# Patient Record
Sex: MALE | Race: ASIAN | Employment: FULL TIME | URBAN - METROPOLITAN AREA
[De-identification: names, ages, dates, MRNs, and addresses within clinical notes are randomized per-mention and may not be internally consistent; named-entity substitution may affect disease eponyms.]

---

## 2017-03-29 ENCOUNTER — OFFICE VISIT (OUTPATIENT)
Dept: INTERNAL MEDICINE CLINIC | Facility: CLINIC | Age: 32
End: 2017-03-29

## 2017-03-29 VITALS
TEMPERATURE: 98 F | WEIGHT: 140 LBS | HEART RATE: 72 BPM | RESPIRATION RATE: 10 BRPM | OXYGEN SATURATION: 99 % | BODY MASS INDEX: 23.32 KG/M2 | DIASTOLIC BLOOD PRESSURE: 82 MMHG | HEIGHT: 65 IN | SYSTOLIC BLOOD PRESSURE: 128 MMHG

## 2017-03-29 DIAGNOSIS — Z86.79 HISTORY OF HYPERTENSION: ICD-10-CM

## 2017-03-29 DIAGNOSIS — Z00.00 HEALTHY ADULT ON ROUTINE PHYSICAL EXAMINATION: Primary | ICD-10-CM

## 2017-03-29 PROCEDURE — 99214 OFFICE O/P EST MOD 30 MIN: CPT | Performed by: INTERNAL MEDICINE

## 2017-03-29 PROCEDURE — 93000 ELECTROCARDIOGRAM COMPLETE: CPT | Performed by: INTERNAL MEDICINE

## 2017-03-29 RX ORDER — MULTIVITAMIN
1 TABLET ORAL DAILY
COMMUNITY

## 2017-03-29 NOTE — PROGRESS NOTES
Astrid Klye is a 33year old male. Patient presents with:  Physical: pt presents to clinic for cpx       HPI:                Had HTN in was past, about [id number]; had medicine [id number]; then off meds. Leifestyle changes with low salt, exercise.     Sc TempSrc: Oral   Resp: 10   Height: 65\"   Weight: 140 lb   SpO2: 99%     Body mass index is 23.3 kg/(m^2). Patient is alert, orientated, in no acute distress  HEENT-pupils equal round reactive to light and accommodation, extraocular muscles intact.   Con

## 2017-10-23 ENCOUNTER — HOSPITAL ENCOUNTER (EMERGENCY)
Facility: HOSPITAL | Age: 32
Discharge: HOME | End: 2017-10-23
Attending: EMERGENCY MEDICINE
Payer: COMMERCIAL

## 2017-10-23 VITALS
HEIGHT: 66 IN | SYSTOLIC BLOOD PRESSURE: 148 MMHG | RESPIRATION RATE: 20 BRPM | TEMPERATURE: 98 F | HEART RATE: 82 BPM | BODY MASS INDEX: 21.69 KG/M2 | DIASTOLIC BLOOD PRESSURE: 97 MMHG | OXYGEN SATURATION: 100 % | WEIGHT: 135 LBS

## 2017-10-23 DIAGNOSIS — B34.9 VIRAL SYNDROME: ICD-10-CM

## 2017-10-23 DIAGNOSIS — R51.9 NONINTRACTABLE EPISODIC HEADACHE, UNSPECIFIED HEADACHE TYPE: Primary | ICD-10-CM

## 2017-10-23 PROCEDURE — 99282 EMERGENCY DEPT VISIT SF MDM: CPT

## 2017-10-24 NOTE — ED NOTES
Pt states he is here for headache. Pt states he got the flu shot on Wednesday and developed a temp of 102 and headache on Thursday. Pt states no temp since Thursday, but continues to have headache. No other symptoms.  No nuchal rigidity, no photophobia, no

## 2017-10-24 NOTE — ED PROVIDER NOTES
Patient Seen in: [facility] Emergency Department    History   Patient presents with:  Headache (neurologic)      HPI    Patient presents complaining of a headache for the past 3 days.   He states that he felt some mild body aches and chills earlier Partners with: Female       Comment:     Other Topics            Concern  Caffeine Concern        Yes    Comment:1 coffee, 1 cola/day        Review of Systems   Constitutional: Positive for chills. Negative for fever.    HENT: Negative for congestion dry.   Nursing note and vitals reviewed.       ED Course      Labs Reviewed - No data to display      Imaging Results Available and Reviewed while in ED:     ED Medications Administered: Medications - No data to display      [facility]      10/23/17  2124 10/23/17 visit in 3 days        Medications Prescribed:  Discharge Medication List as of 10/23/2017 10:51 PM

## 2018-08-01 ENCOUNTER — OFFICE VISIT (OUTPATIENT)
Dept: INTERNAL MEDICINE CLINIC | Facility: CLINIC | Age: 33
End: 2018-08-01
Payer: COMMERCIAL

## 2018-08-01 VITALS
BODY MASS INDEX: 22.16 KG/M2 | HEART RATE: 84 BPM | HEIGHT: 66 IN | SYSTOLIC BLOOD PRESSURE: 124 MMHG | WEIGHT: 137.88 LBS | DIASTOLIC BLOOD PRESSURE: 82 MMHG

## 2018-08-01 DIAGNOSIS — Z86.79 HISTORY OF HYPERTENSION: ICD-10-CM

## 2018-08-01 DIAGNOSIS — Z00.00 ROUTINE PHYSICAL EXAMINATION: Primary | ICD-10-CM

## 2018-08-01 PROCEDURE — 99395 PREV VISIT EST AGE 18-39: CPT | Performed by: PHYSICIAN ASSISTANT

## 2018-08-01 NOTE — PROGRESS NOTES
HPI:    Patient ID: Alfonso Montenegro is a 26year old male. HPI   Patient who is new to the clinic presents today requesting physical exam.  He has documented history of hypertension but is currently taking no medications.   He has previously been receiving Rfl:    acetaminophen 500 MG Oral Tab Take 500 mg by mouth every 6 (six) hours as needed for Pain. Disp:  Rfl:    Multiple Vitamin (DAILY DALJIT) Oral Tab Take 1 tablet by mouth daily.  Disp:  Rfl:    ibuprofen 400 MG Oral Tab Take 400 mg by mouth every 6 (s Follow-up in 1 year for annual physical.  - CBC WITH DIFFERENTIAL WITH PLATELET; Future  - COMP METABOLIC PANEL (14); Future  - LIPID PANEL; Future  - TSH W REFLEX TO FREE T4; Future  - VITAMIN B12; Future    2.  History of hypertension  Patient has history

## 2018-08-02 ENCOUNTER — LAB ENCOUNTER (OUTPATIENT)
Dept: LAB | Facility: HOSPITAL | Age: 33
End: 2018-08-02
Attending: PHYSICIAN ASSISTANT
Payer: COMMERCIAL

## 2018-08-02 DIAGNOSIS — Z00.00 ROUTINE PHYSICAL EXAMINATION: ICD-10-CM

## 2018-08-02 LAB
ALBUMIN SERPL BCP-MCNC: 4.4 G/DL (ref 3.5–4.8)
ALBUMIN/GLOB SERPL: 1.3 {RATIO} (ref 1–2)
ALP SERPL-CCNC: 55 U/L (ref 32–100)
ALT SERPL-CCNC: 14 U/L (ref 17–63)
ANION GAP SERPL CALC-SCNC: 7 MMOL/L (ref 0–18)
AST SERPL-CCNC: 22 U/L (ref 15–41)
BASOPHILS # BLD: 0.1 K/UL (ref 0–0.2)
BASOPHILS NFR BLD: 1 %
BILIRUB SERPL-MCNC: 0.9 MG/DL (ref 0.3–1.2)
BUN SERPL-MCNC: 13 MG/DL (ref 8–20)
BUN/CREAT SERPL: 13.5 (ref 10–20)
CALCIUM SERPL-MCNC: 9.6 MG/DL (ref 8.5–10.5)
CHLORIDE SERPL-SCNC: 103 MMOL/L (ref 95–110)
CHOLEST SERPL-MCNC: 162 MG/DL (ref 110–200)
CO2 SERPL-SCNC: 28 MMOL/L (ref 22–32)
CREAT SERPL-MCNC: 0.96 MG/DL (ref 0.5–1.5)
EOSINOPHIL # BLD: 0.1 K/UL (ref 0–0.7)
EOSINOPHIL NFR BLD: 3 %
ERYTHROCYTE [DISTWIDTH] IN BLOOD BY AUTOMATED COUNT: 13.9 % (ref 11–15)
GLOBULIN PLAS-MCNC: 3.3 G/DL (ref 2.5–3.7)
GLUCOSE SERPL-MCNC: 93 MG/DL (ref 70–99)
HCT VFR BLD AUTO: 46.8 % (ref 41–52)
HDLC SERPL-MCNC: 62 MG/DL
HGB BLD-MCNC: 15.7 G/DL (ref 13.5–17.5)
LDLC SERPL CALC-MCNC: 85 MG/DL (ref 0–99)
LYMPHOCYTES # BLD: 1.8 K/UL (ref 1–4)
LYMPHOCYTES NFR BLD: 33 %
MCH RBC QN AUTO: 29.2 PG (ref 27–32)
MCHC RBC AUTO-ENTMCNC: 33.5 G/DL (ref 32–37)
MCV RBC AUTO: 87.2 FL (ref 80–100)
MONOCYTES # BLD: 0.5 K/UL (ref 0–1)
MONOCYTES NFR BLD: 9 %
NEUTROPHILS # BLD AUTO: 3 K/UL (ref 1.8–7.7)
NEUTROPHILS NFR BLD: 55 %
NONHDLC SERPL-MCNC: 100 MG/DL
OSMOLALITY UR CALC.SUM OF ELEC: 286 MOSM/KG (ref 275–295)
PATIENT FASTING: YES
PLATELET # BLD AUTO: 265 K/UL (ref 140–400)
PMV BLD AUTO: 7.8 FL (ref 7.4–10.3)
POTASSIUM SERPL-SCNC: 4.3 MMOL/L (ref 3.3–5.1)
PROT SERPL-MCNC: 7.7 G/DL (ref 5.9–8.4)
RBC # BLD AUTO: 5.36 M/UL (ref 4.5–5.9)
SODIUM SERPL-SCNC: 138 MMOL/L (ref 136–144)
TRIGL SERPL-MCNC: 77 MG/DL (ref 1–149)
TSH SERPL-ACNC: 4.43 UIU/ML (ref 0.45–5.33)
VIT B12 SERPL-MCNC: 397 PG/ML (ref 181–914)
WBC # BLD AUTO: 5.4 K/UL (ref 4–11)

## 2018-08-02 PROCEDURE — 80061 LIPID PANEL: CPT

## 2018-08-02 PROCEDURE — 80053 COMPREHEN METABOLIC PANEL: CPT

## 2018-08-02 PROCEDURE — 82607 VITAMIN B-12: CPT

## 2018-08-02 PROCEDURE — 36415 COLL VENOUS BLD VENIPUNCTURE: CPT

## 2018-08-02 PROCEDURE — 84443 ASSAY THYROID STIM HORMONE: CPT

## 2018-08-02 PROCEDURE — 85025 COMPLETE CBC W/AUTO DIFF WBC: CPT

## 2018-08-29 ENCOUNTER — LAB ENCOUNTER (OUTPATIENT)
Dept: LAB | Facility: HOSPITAL | Age: 33
End: 2018-08-29
Attending: UROLOGY
Payer: COMMERCIAL

## 2018-08-29 DIAGNOSIS — Z30.2 ENCOUNTER FOR STERILIZATION: Primary | ICD-10-CM

## 2018-08-29 PROCEDURE — 89321 SEMEN ANAL SPERM DETECTION: CPT

## 2019-11-25 ENCOUNTER — TELEPHONE (OUTPATIENT)
Dept: INTERNAL MEDICINE CLINIC | Facility: CLINIC | Age: 34
End: 2019-11-25

## 2019-11-27 PROBLEM — I10 ESSENTIAL HYPERTENSION: Status: ACTIVE | Noted: 2019-11-27

## 2019-12-31 ENCOUNTER — HOSPITAL ENCOUNTER (EMERGENCY)
Facility: HOSPITAL | Age: 34
Discharge: HOME | End: 2019-12-31
Attending: EMERGENCY MEDICINE
Payer: COMMERCIAL

## 2019-12-31 ENCOUNTER — APPOINTMENT (OUTPATIENT)
Dept: GENERAL RADIOLOGY | Facility: HOSPITAL | Age: 34
End: 2019-12-31
Attending: EMERGENCY MEDICINE
Payer: COMMERCIAL

## 2019-12-31 VITALS
DIASTOLIC BLOOD PRESSURE: 78 MMHG | BODY MASS INDEX: 22.18 KG/M2 | OXYGEN SATURATION: 99 % | HEIGHT: 66 IN | TEMPERATURE: 98 F | HEART RATE: 73 BPM | WEIGHT: 138 LBS | SYSTOLIC BLOOD PRESSURE: 153 MMHG | RESPIRATION RATE: 20 BRPM

## 2019-12-31 DIAGNOSIS — R07.89 CHEST PRESSURE: Primary | ICD-10-CM

## 2019-12-31 LAB
ANION GAP SERPL CALC-SCNC: 0 MMOL/L (ref 0–18)
BASOPHILS # BLD AUTO: 0.01 X10(3) UL (ref 0–0.2)
BASOPHILS NFR BLD AUTO: 0.1 %
BUN BLD-MCNC: 13 MG/DL (ref 7–18)
BUN/CREAT SERPL: 12.5 (ref 10–20)
CALCIUM BLD-MCNC: 9.3 MG/DL (ref 8.5–10.1)
CHLORIDE SERPL-SCNC: 103 MMOL/L (ref 98–112)
CO2 SERPL-SCNC: 30 MMOL/L (ref 21–32)
CREAT BLD-MCNC: 1.04 MG/DL (ref 0.7–1.3)
DEPRECATED RDW RBC AUTO: 37.5 FL (ref 35.1–46.3)
EOSINOPHIL # BLD AUTO: 0.02 X10(3) UL (ref 0–0.7)
EOSINOPHIL NFR BLD AUTO: 0.3 %
ERYTHROCYTE [DISTWIDTH] IN BLOOD BY AUTOMATED COUNT: 12.4 % (ref 11–15)
GLUCOSE BLD-MCNC: 118 MG/DL (ref 70–99)
HCT VFR BLD AUTO: 42.7 % (ref 39–53)
HGB BLD-MCNC: 14.7 G/DL (ref 13–17.5)
IMM GRANULOCYTES # BLD AUTO: 0.03 X10(3) UL (ref 0–1)
IMM GRANULOCYTES NFR BLD: 0.4 %
LYMPHOCYTES # BLD AUTO: 0.84 X10(3) UL (ref 1–4)
LYMPHOCYTES NFR BLD AUTO: 11.3 %
MCH RBC QN AUTO: 29 PG (ref 26–34)
MCHC RBC AUTO-ENTMCNC: 34.4 G/DL (ref 31–37)
MCV RBC AUTO: 84.2 FL (ref 80–100)
MONOCYTES # BLD AUTO: 0.52 X10(3) UL (ref 0.1–1)
MONOCYTES NFR BLD AUTO: 7 %
NEUTROPHILS # BLD AUTO: 6.01 X10 (3) UL (ref 1.5–7.7)
NEUTROPHILS # BLD AUTO: 6.01 X10(3) UL (ref 1.5–7.7)
NEUTROPHILS NFR BLD AUTO: 80.9 %
OSMOLALITY SERPL CALC.SUM OF ELEC: 277 MOSM/KG (ref 275–295)
PLATELET # BLD AUTO: 250 10(3)UL (ref 150–450)
POTASSIUM SERPL-SCNC: 3.6 MMOL/L (ref 3.5–5.1)
RBC # BLD AUTO: 5.07 X10(6)UL (ref 4.3–5.7)
SODIUM SERPL-SCNC: 133 MMOL/L (ref 136–145)
TROPONIN I SERPL-MCNC: <0.045 NG/ML (ref ?–0.04)
WBC # BLD AUTO: 7.4 X10(3) UL (ref 4–11)

## 2019-12-31 PROCEDURE — 85025 COMPLETE CBC W/AUTO DIFF WBC: CPT | Performed by: EMERGENCY MEDICINE

## 2019-12-31 PROCEDURE — 82962 GLUCOSE BLOOD TEST: CPT

## 2019-12-31 PROCEDURE — 36415 COLL VENOUS BLD VENIPUNCTURE: CPT

## 2019-12-31 PROCEDURE — 99284 EMERGENCY DEPT VISIT MOD MDM: CPT

## 2019-12-31 PROCEDURE — 71046 X-RAY EXAM CHEST 2 VIEWS: CPT | Performed by: EMERGENCY MEDICINE

## 2019-12-31 PROCEDURE — 93005 ELECTROCARDIOGRAM TRACING: CPT

## 2019-12-31 PROCEDURE — 80048 BASIC METABOLIC PNL TOTAL CA: CPT | Performed by: EMERGENCY MEDICINE

## 2019-12-31 PROCEDURE — 93010 ELECTROCARDIOGRAM REPORT: CPT | Performed by: EMERGENCY MEDICINE

## 2019-12-31 PROCEDURE — 84484 ASSAY OF TROPONIN QUANT: CPT | Performed by: EMERGENCY MEDICINE

## 2019-12-31 NOTE — ED INITIAL ASSESSMENT (HPI)
Pt reports some left sided chest \"pressure or tightness\". Also reports some feeling of SOB. Pt is anxious and restless with c/o numbness and tingling to extremities.

## 2019-12-31 NOTE — ED PROVIDER NOTES
Patient Seen in: [facility] Emergency Department      History   Patient presents with:  Chest Pain Angina    Stated Complaint:     HPI    12-year-old male presents for evaluation of chest pressure and shortness of breath.   Patient reports intermit Musculoskeletal: Normal range of motion and neck supple. Cardiovascular:      Rate and Rhythm: Normal rate and regular rhythm. Heart sounds: Normal heart sounds.       Comments: No lower extremity swelling bilaterally  Pulmonary:      Effort: Pu CHEST (CPT=71046)   Final Result    PROCEDURE: XR CHEST PA + LAT CHEST (CPT=71020)         COMPARISON: None. INDICATIONS: Chest pressure, pain and shortness of breath upon exertion x2     days. History of high blood pressure.          TECHNIQUE:   T After review and interpretation of the above emergency department workup, the patient was found to have Chest pressure  (primary encounter diagnosis)    Plan: Well-appearing patient with history of hypertension, unremarkable emergency department evaluatio

## 2020-04-08 PROBLEM — F41.9 ANXIETY: Status: ACTIVE | Noted: 2020-04-08

## 2021-02-19 PROBLEM — F41.9 ANXIETY: Status: RESOLVED | Noted: 2020-04-08 | Resolved: 2021-02-19

## 2021-02-19 PROBLEM — K13.79 OTHER LESIONS OF ORAL MUCOSA: Status: ACTIVE | Noted: 2021-02-19

## (undated) NOTE — ED AVS SNAPSHOT
Damaso Isidro   MRN: [id number]    Department:  [facility] Emergency Department   Date of Visit:  10/23/2017           Disclosure     Insurance plans vary and the physician(s) referred by the ER may not be covered by your plan.  Please contact yo CARE PHYSICIAN AT ONCE OR RETURN IMMEDIATELY TO THE EMERGENCY DEPARTMENT. If you have been prescribed any medication(s), please fill your prescription right away and begin taking the medication(s) as directed.   If you believe that any of the medications

## (undated) NOTE — ED AVS SNAPSHOT
Berna Shah   MRN: [id number]    Department:  [facility] Emergency Department   Date of Visit:  12/31/2019           Disclosure     Insurance plans vary and the physician(s) referred by the ER may not be covered by your plan.  Please contact yo CARE PHYSICIAN AT ONCE OR RETURN IMMEDIATELY TO THE EMERGENCY DEPARTMENT. If you have been prescribed any medication(s), please fill your prescription right away and begin taking the medication(s) as directed.   If you believe that any of the medications